# Patient Record
Sex: MALE | Race: WHITE | URBAN - METROPOLITAN AREA
[De-identification: names, ages, dates, MRNs, and addresses within clinical notes are randomized per-mention and may not be internally consistent; named-entity substitution may affect disease eponyms.]

---

## 2018-08-20 ENCOUNTER — HOSPITAL ENCOUNTER (EMERGENCY)
Facility: CLINIC | Age: 23
Discharge: HOME | End: 2018-08-20
Attending: EMERGENCY MEDICINE | Admitting: EMERGENCY MEDICINE
Payer: COMMERCIAL

## 2018-08-20 ENCOUNTER — APPOINTMENT (OUTPATIENT)
Dept: GENERAL RADIOLOGY | Facility: CLINIC | Age: 23
End: 2018-08-20
Attending: EMERGENCY MEDICINE
Payer: COMMERCIAL

## 2018-08-20 VITALS
SYSTOLIC BLOOD PRESSURE: 134 MMHG | OXYGEN SATURATION: 99 % | HEART RATE: 72 BPM | HEIGHT: 72 IN | TEMPERATURE: 98.3 F | DIASTOLIC BLOOD PRESSURE: 74 MMHG | BODY MASS INDEX: 27.77 KG/M2 | WEIGHT: 205 LBS | RESPIRATION RATE: 16 BRPM

## 2018-08-20 DIAGNOSIS — S61.431A PUNCTURE WOUND OF RIGHT HAND WITHOUT FOREIGN BODY, INITIAL ENCOUNTER: ICD-10-CM

## 2018-08-20 PROCEDURE — 90714 TD VACC NO PRESV 7 YRS+ IM: CPT | Performed by: EMERGENCY MEDICINE

## 2018-08-20 PROCEDURE — 90471 IMMUNIZATION ADMIN: CPT | Performed by: EMERGENCY MEDICINE

## 2018-08-20 PROCEDURE — 73130 X-RAY EXAM OF HAND: CPT | Mod: RT

## 2018-08-20 PROCEDURE — 25000128 H RX IP 250 OP 636: Performed by: EMERGENCY MEDICINE

## 2018-08-20 PROCEDURE — 99283 EMERGENCY DEPT VISIT LOW MDM: CPT | Performed by: EMERGENCY MEDICINE

## 2018-08-20 PROCEDURE — 99282 EMERGENCY DEPT VISIT SF MDM: CPT | Mod: Z6 | Performed by: EMERGENCY MEDICINE

## 2018-08-20 RX ADMIN — CLOSTRIDIUM TETANI TOXOID ANTIGEN (FORMALDEHYDE INACTIVATED) AND CORYNEBACTERIUM DIPHTHERIAE TOXOID ANTIGEN (FORMALDEHYDE INACTIVATED) 0.5 ML: 5; 2 INJECTION, SUSPENSION INTRAMUSCULAR at 13:55

## 2018-08-20 ASSESSMENT — ENCOUNTER SYMPTOMS
FEVER: 0
HEADACHES: 0
CONFUSION: 0
EYE REDNESS: 0
ARTHRALGIAS: 0
NECK STIFFNESS: 0
ABDOMINAL PAIN: 0
SHORTNESS OF BREATH: 0
DIFFICULTY URINATING: 0
COLOR CHANGE: 0

## 2018-08-20 NOTE — ED TRIAGE NOTES
Wood screwdriver driven through right palmar hand (accidental) with swelling to outer hand.   Nonbloody. Ice applied, xray ordered.

## 2018-08-20 NOTE — DISCHARGE INSTRUCTIONS
Puncture Wound       A puncture wound occurs when a pointed object pushes into the skin. It may go into the tissues below the skin, including fat and muscle. This type of wound is narrow and deep and can be hard to clean. Because of this, puncture wounds are at high risk for becoming infected.  X-rays may be done to check the wound for objects stuck under the skin. Your may also need a tetanus shot. This is given if you are not up to date on this vaccination and the object that caused the wound may lead to tetanus.  Home care    Your healthcare provider may prescribe an antibiotic. This is to help prevent infection. Follow all instructions for taking this medicine. Take the medicine every day until it is gone or you are told to stop. You should not have any left over.    The healthcare provider may prescribe medicines for pain. Follow instructions for taking them.    You can take acetaminophen or ibuprofen for pain, unless you were given a different pain medicine to use.     Follow the healthcare provider s instructions on how to care for the wound.    Keep the wound clean and dry. Do not get the wound wet until you are told it is okay to do so. If the area gets wet, gently pat it dry with a clean cloth. Replace the wet bandage with a dry one.    If a bandage was applied and it becomes wet or dirty, replace it. Otherwise, leave it in place for the first 24 hours.    Once you can get the wound wet, you may shower as usual but do not soak the wound in water (no tub baths or swimming)    Even with proper treatment, a puncture wound may become infected. Check the wound daily for signs of infection listed below.  Follow-up care  Follow up with your healthcare provider as advised.   When to seek medical advice  Call your healthcare provider right away if any of these occur:    Signs of infection, including:  ? Increasing redness or swelling around the wound  ? Increased warmth of the wound  ? Worsening pain  ? Red  streaking lines away from the wound  ? Draining pus    Fever of 100.4 F (38. C) or higher or as directed by your healthcare provider    Wound changes colors    Numbness around the wound    Decreased movement around the injured area  Date Last Reviewed: 8/24/2015 2000-2017 The [employer]. [address] [place] [address]. All rights reserved. This information is not intended as a substitute for professional medical care. Always follow your healthcare professional's instructions.          First Aid: Punctures  A break in the skin is an open door, inviting dirt and germs to enter your body and cause infection.  Call 911  Call 911 right away if the victim has any of the following:    Uncontrollable bleeding    Shock symptoms:  ? Pale or clammy skin.  ? The pulse may be so light or race so fast that you can t count the beats.  ? The victim may be confused or unable to concentrate or may stare blankly. Over time, the victim may even become unconscious.    A large object, such as a knife, embedded in the body  While you wait for help:    Reassure the person.    Continue to control bleeding with direct pressure.   Step 1. Clean thoroughly    Don't squeeze the wound.    If the puncture wound is not severe and does not need medical attention, soak the wound in warm, soapy water to help the injury heal from the inside out.    Then cover the wound with a gauze dressing to absorb any drainage and let air in for faster healing.  Step 2. Keep the embedded objects from moving    If an object lodges in the body, put pressure around the wound to control bleeding. (Wear gloves or use other protection as a barrier between you and any blood.)    Wrap gauze or cloth around the object to hold it steady. Tape the wrapping in place.    Don't increase the risk for internal bleeding by trying to remove an embedded object.  When to call your healthcare provider  Call your healthcare provider right away if any of these  occur:    The wound covers a large area or is deep    The ear or eye is punctured    An object such as a nail remains lodged in the body    The injury is on the face or any area where scarring is a concern    The person needs protection against tetanus. This is a disease caused by bacteria that may enter any break in the skin and bring on a life-threatening illness called lockjaw. The body s defenses may need a booster injection if it s been more than five years since the last tetanus vaccine.   Date Last Reviewed: 12/1/2016 2000-2017 [employer]. [address] [place], [place] [id number]. All rights reserved. This information is not intended as a substitute for professional medical care. Always follow your healthcare professional's instructions.

## 2018-08-20 NOTE — ED AVS SNAPSHOT
[facility], [place], Emergency Department    [address] [place] [id number]    Phone:  [phone]                                       Esther Lin   MRN: [id number]    Department:  [facility], Emergency Department   Date of Visit:  8/20/2018           After Visit Summary Signature Page     I have received my discharge instructions, and my questions have been answered. I have discussed any challenges I see with this plan with the nurse or doctor.    ..........................................................................................................................................  Patient/Patient Representative Signature      ..........................................................................................................................................  Patient Representative Print Name and Relationship to Patient    ..................................................               ................................................  Date                                            Time    ..........................................................................................................................................  Reviewed by Signature/Title    ...................................................              ..............................................  Date                                                            Time

## 2018-08-20 NOTE — ED AVS SNAPSHOT
[facility], Emergency Department    [address] [facility] [address]    Phone:  [phone]                                       Esther Lin   MRN: [id number]    Department:  [facility], Emergency Department   Date of Visit:  8/20/2018           Patient Information     Date Of Birth          1995        Your diagnoses for this visit were:     Puncture wound of right hand without foreign body, initial encounter        You were seen by Jerome Elizondo MD.      Follow-up Information     Follow up with [facility], Emergency Department.    Specialty:  EMERGENCY MEDICINE    Why:  As needed    Contact information:    [address] [facility] [id number] [phone]    Additional information:    The [facility] is located on the corner of [facility] and [facility] on the [facility]. It is easily accessible from virtually any point in the [place] area, via [address] and [address].        Discharge Instructions         Puncture Wound       A puncture wound occurs when a pointed object pushes into the skin. It may go into the tissues below the skin, including fat and muscle. This type of wound is narrow and deep and can be hard to clean. Because of this, puncture wounds are at high risk for becoming infected.  X-rays may be done to check the wound for objects stuck under the skin. Your may also need a tetanus shot. This is given if you are not up to date on this vaccination and the object that caused the wound may lead to tetanus.  Home care    Your healthcare provider may prescribe an antibiotic. This is to help prevent infection. Follow all instructions for taking this medicine. Take the medicine every day until it is gone or you are told to stop. You should not have any left over.    The healthcare provider may prescribe medicines for pain. Follow instructions for taking them.    You can take acetaminophen or ibuprofen for pain, unless you were  given a different pain medicine to use.     Follow the healthcare provider s instructions on how to care for the wound.    Keep the wound clean and dry. Do not get the wound wet until you are told it is okay to do so. If the area gets wet, gently pat it dry with a clean cloth. Replace the wet bandage with a dry one.    If a bandage was applied and it becomes wet or dirty, replace it. Otherwise, leave it in place for the first 24 hours.    Once you can get the wound wet, you may shower as usual but do not soak the wound in water (no tub baths or swimming)    Even with proper treatment, a puncture wound may become infected. Check the wound daily for signs of infection listed below.  Follow-up care  Follow up with your healthcare provider as advised.   When to seek medical advice  Call your healthcare provider right away if any of these occur:    Signs of infection, including:  ? Increasing redness or swelling around the wound  ? Increased warmth of the wound  ? Worsening pain  ? Red streaking lines away from the wound  ? Draining pus    Fever of 100.4 F (38. C) or higher or as directed by your healthcare provider    Wound changes colors    Numbness around the wound    Decreased movement around the injured area  Date Last Reviewed: 8/24/2015 2000-2017 [employer]. [address] [place] [address], [place] [address]. All rights reserved. This information is not intended as a substitute for professional medical care. Always follow your healthcare professional's instructions.          First Aid: Punctures  A break in the skin is an open door, inviting dirt and germs to enter your body and cause infection.  Call 911  Call 911 right away if the victim has any of the following:    Uncontrollable bleeding    Shock symptoms:  ? Pale or clammy skin.  ? The pulse may be so light or race so fast that you can t count the beats.  ? The victim may be confused or unable to concentrate or may stare blankly. Over time, the  victim may even become unconscious.    A large object, such as a knife, embedded in the body  While you wait for help:    Reassure the person.    Continue to control bleeding with direct pressure.   Step 1. Clean thoroughly    Don't squeeze the wound.    If the puncture wound is not severe and does not need medical attention, soak the wound in warm, soapy water to help the injury heal from the inside out.    Then cover the wound with a gauze dressing to absorb any drainage and let air in for faster healing.  Step 2. Keep the embedded objects from moving    If an object lodges in the body, put pressure around the wound to control bleeding. (Wear gloves or use other protection as a barrier between you and any blood.)    Wrap gauze or cloth around the object to hold it steady. Tape the wrapping in place.    Don't increase the risk for internal bleeding by trying to remove an embedded object.  When to call your healthcare provider  Call your healthcare provider right away if any of these occur:    The wound covers a large area or is deep    The ear or eye is punctured    An object such as a nail remains lodged in the body    The injury is on the face or any area where scarring is a concern    The person needs protection against tetanus. This is a disease caused by bacteria that may enter any break in the skin and bring on a life-threatening illness called lockjaw. The body s defenses may need a booster injection if it s been more than five years since the last tetanus vaccine.   Date Last Reviewed: 12/1/2016 2000-2017 [employer]. [address] [place] [address], [place] [address]. All rights reserved. This information is not intended as a substitute for professional medical care. Always follow your healthcare professional's instructions.          24 Hour Appointment Hotline       To make an appointment at any [facility] clinic, call [phone] ([phone]). If you don't have a family doctor or clinic, we  "will help you find one. [facility] are conveniently located to serve the needs of you and your family.             Review of your medicines      Notice     You have not been prescribed any medications.            Procedures and tests performed during your visit     XR Hand Right G/E 3 Views      Orders Needing Specimen Collection     None      Pending Results     Date and Time Order Name Status Description    2018 1305 XR Hand Right G/E 3 Views Preliminary             Pending Culture Results     No orders found from 2018 to 2018.            Pending Results Instructions     If you had any lab results that were not finalized at the time of your Discharge, you can call the ED Lab Result RN at [phone]. You will be contacted by this team for any positive Lab results or changes in treatment. The nurses are available 7 days a week from 10A to 6:30P.  You can leave a message 24 hours per day and they will return your call.        Thank you for choosing [facility]       Thank you for choosing [facility] for your care. Our goal is always to provide you with excellent care. Hearing back from our patients is one way we can continue to improve our services. Please take a few minutes to complete the written survey that you may receive in the mail after you visit with us. Thank you!        [facility] Information     [employer] lets you send messages to your doctor, view your test results, renew your prescriptions, schedule appointments and more. To sign up, go to [email or web address] . Click on \"Log in\" on the left side of the screen, which will take you to the Welcome page. Then click on \"Sign up Now\" on the right side of the page.     You will be asked to enter the access code listed below, as well as some personal information. Please follow the directions to create your username and password.     Your access code is: [email or web address]  Expires: 2018  2:34 PM     Your access code will  in 90 days. If " you need help or a new code, please call your [facility] clinic or [phone].        Care EveryWhere ID     This is your Care EveryWhere ID. This could be used by other organizations to access your [facility] medical records  [id number]        Equal Access to Services     FERNANDA GUERRA : Oscar Rahman, jaci sow, albert kaalsharon del cid, guillermo lindsey. So [facility] [phone].    ATENCIÓN: Si habla español, tiene a pool disposición servicios gratuitos de asistencia lingüística. Llame al [phone].    We comply with applicable federal civil rights laws and [place] laws. We do not discriminate on the basis of race, color, national origin, age, disability, sex, sexual orientation, or gender identity.            After Visit Summary       This is your record. Keep this with you and show to your community pharmacist(s) and doctor(s) at your next visit.

## 2018-08-20 NOTE — ED PROVIDER NOTES
History     Chief Complaint   Patient presents with     Hand Pain     HPI  Esther Lin is a 23 year old male with a history of seizures who presents for evaluation of right hand injury.  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  He notes that he was holding a screwdriver with his left hand as he is left-hand-dominant.  He thinks that the screwdriver went all the way through his hand though he did not have evidence of bleeding on the opposite side of his hand.  He currently denies other injury as well as paresthesias, weakness or significant pain.  He is unsure of his last tetanus shot.    I have reviewed the Medications, Allergies, Past Medical and Surgical History, and Social History in the Epic system.    Review of Systems   Constitutional: Negative for fever.   HENT: Negative for congestion.    Eyes: Negative for redness.   Respiratory: Negative for shortness of breath.    Cardiovascular: Negative for chest pain.   Gastrointestinal: Negative for abdominal pain.   Genitourinary: Negative for difficulty urinating.   Musculoskeletal: Negative for arthralgias and neck stiffness.   Skin: Negative for color change.   Neurological: Negative for headaches.   Psychiatric/Behavioral: Negative for confusion.       Physical Exam   BP: 136/75  Pulse: 71  Temp: 98.3  F (36.8  C)  Resp: 15  Height: 182.9 cm (6')  Weight: 93 kg (205 lb)  SpO2: 100 %      Physical Exam   Musculoskeletal:        Right hand: He exhibits tenderness. He exhibits normal range of motion, no bony tenderness, normal capillary refill, no deformity, no laceration and no swelling. Normal sensation noted.           ED Course     ED Course     Procedures        X-ray reveals no evidence of foreign body       Labs Ordered and Resulted from Time of ED Arrival Up to the Time of Departure from the ED - No data to  display         Assessments & Plan (with Medical Decision Making)   23-year-old male presents for evaluation of right hand puncture wound.  This occurred with a clean Wood screwdriver.  Exam revealed evidence of puncture wound as noted above into the palm at the level of the mid fourth metacarpal.  Patient was noted to have normal range of motion, normal neurologic exam and normal pulses.  X-ray revealed no evidence of foreign body.  The wound had been cleaned and a tetanus was provided.  Patient will be discharged with instructions on wound care with the need to seek medical care if signs or symptoms of infection occur.    I have reviewed the nursing notes.    I have reviewed the findings, diagnosis, plan and need for follow up with the patient.    New Prescriptions    No medications on file       Final diagnoses:   Puncture wound of right hand without foreign body, initial encounter       8/20/2018   [facility], [place], EMERGENCY DEPARTMENT     Jerome Elizondo MD  08/20/18 3854